# Patient Record
Sex: FEMALE | Race: BLACK OR AFRICAN AMERICAN | NOT HISPANIC OR LATINO | Employment: FULL TIME | URBAN - METROPOLITAN AREA
[De-identification: names, ages, dates, MRNs, and addresses within clinical notes are randomized per-mention and may not be internally consistent; named-entity substitution may affect disease eponyms.]

---

## 2018-03-13 ENCOUNTER — OFFICE VISIT (OUTPATIENT)
Dept: FAMILY MEDICINE | Facility: CLINIC | Age: 48
End: 2018-03-13

## 2018-03-13 VITALS
RESPIRATION RATE: 19 BRPM | OXYGEN SATURATION: 100 % | HEIGHT: 67 IN | DIASTOLIC BLOOD PRESSURE: 76 MMHG | BODY MASS INDEX: 34.26 KG/M2 | TEMPERATURE: 97.9 F | SYSTOLIC BLOOD PRESSURE: 126 MMHG | HEART RATE: 91 BPM | WEIGHT: 218.25 LBS

## 2018-03-13 DIAGNOSIS — E66.9 DIABETES MELLITUS TYPE 2 IN OBESE: ICD-10-CM

## 2018-03-13 DIAGNOSIS — N89.8 VAGINAL DISCHARGE: ICD-10-CM

## 2018-03-13 DIAGNOSIS — E11.69 DIABETES MELLITUS TYPE 2 IN OBESE: ICD-10-CM

## 2018-03-13 DIAGNOSIS — R10.32 LLQ ABDOMINAL PAIN: Primary | ICD-10-CM

## 2018-03-13 DIAGNOSIS — B96.89 BACTERIAL VAGINOSIS: ICD-10-CM

## 2018-03-13 DIAGNOSIS — B37.31 CANDIDIASIS OF VAGINA: ICD-10-CM

## 2018-03-13 DIAGNOSIS — N76.0 BACTERIAL VAGINOSIS: ICD-10-CM

## 2018-03-13 LAB
BACTERIA: NORMAL
BILIRUBIN UR: NEGATIVE
BLOOD UR: ABNORMAL
BUN SERPL-MCNC: 12 MG/DL (ref 5–24)
CALCIUM SERPL-MCNC: 9.1 MG/DL (ref 8.5–10.4)
CHLORIDE SERPLBLD-SCNC: 100 MMOL/L (ref 94–109)
CLUE CELLS: NORMAL
CO2 SERPL-SCNC: 28 MMOL/L (ref 20–32)
CREAT SERPL-MCNC: 0.8 MG/DL (ref 0.6–1.3)
EGFR CALCULATED (BLACK REFERENCE): >90 ML/MIN
EGFR CALCULATED (NON BLACK REFERENCE): 82.4 ML/MIN
GLUCOSE SERPL-MCNC: 93 MG/DL (ref 60–109)
GLUCOSE URINE: NEGATIVE
HBA1C MFR BLD: 6.2 % (ref 4.1–5.7)
KETONES UR QL: ABNORMAL
LEUKOCYTE ESTERASE UR: NEGATIVE
MOTILE TRICHOMONAS: NEGATIVE
NITRITE UR QL STRIP: NEGATIVE
ODOR: NORMAL
PH UR STRIP: 5.5 [PH] (ref 5–7)
PH WET PREP: NORMAL
POTASSIUM SERPL-SCNC: 4 MMOL/L (ref 3.4–5.3)
PROTEIN UR: NEGATIVE
SODIUM SERPL-SCNC: 135 MMOL/L (ref 133–144)
SP GR UR STRIP: 1.02
UROBILINOGEN UR STRIP-ACNC: ABNORMAL
WBC WET PREP: NORMAL
YEAST: PRESENT

## 2018-03-13 RX ORDER — FLUCONAZOLE 150 MG/1
150 TABLET ORAL ONCE
Qty: 1 TABLET | Refills: 0 | Status: SHIPPED | OUTPATIENT
Start: 2018-03-13 | End: 2018-03-13

## 2018-03-13 RX ORDER — METRONIDAZOLE 500 MG/1
500 TABLET ORAL 2 TIMES DAILY
Qty: 21 TABLET | Refills: 0 | Status: SHIPPED | OUTPATIENT
Start: 2018-03-13

## 2018-03-13 RX ORDER — CYCLOBENZAPRINE HCL 10 MG
5-10 TABLET ORAL 3 TIMES DAILY PRN
Qty: 15 TABLET | Refills: 0 | Status: SHIPPED | OUTPATIENT
Start: 2018-03-13

## 2018-03-13 NOTE — LETTER
March 15, 2018      Shahla Dey  [address] [facility] [address]        Dear Shahla,    Your results from your recent clinic visit show that you have a minor infection in your vagina that we already discussed. Make sure you  those antiinfectives I sent to the pharmacy and take the full course. Your other results were normal including your screen for diabetes.     Please see below for your test results.    Resulted Orders   Urinalysis ([facility])   Result Value Ref Range    Specific Gravity Urine 1.025 1.005 - 1.030    pH Urine 5.5 4.5 - 8.0    Leukocyte Esterase UR Negative NEGATIVE    Nitrite Urine Negative NEGATIVE    Protein UR Negative NEGATIVE    Glucose Urine Negative NEGATIVE    Ketones Urine Trace (A) NEGATIVE    Urobilinogen mg/dL 0.2 E.U./dL 0.2 E.U./dL    Bilirubin UR Negative NEGATIVE    Blood UR 2+ (A) NEGATIVE   Basic Metabolic Panel ([facility])  - Results < 1 hr   Result Value Ref Range    Glucose 93.0 60.0 - 109.0 mg/dL    Urea Nitrogen 12.0 5.0 - 24.0 mg/dL    Creatinine 0.8 0.6 - 1.3 mg/dL    Sodium 135.0 133.0 - 144.0 mmol/L    Potassium 4.0 3.4 - 5.3 mmol/L    Chloride 100.0 94.0 - 109.0 mmol/L    Carbon Dioxide 28.0 20.0 - 32.0 mmol/L    Calcium 9.1 8.5 - 10.4 mg/dL    eGFR Calculated (Non Black Reference) 82.4 >60.0 mL/min    eGFR Calculated (Black Reference) >90 >60.0 mL/min   Hemoglobin A1c ([facility])   Result Value Ref Range    Hemoglobin A1C 6.2 (H) 4.1 - 5.7 %   Wet Prep ([facility])   Result Value Ref Range    Yeast Wet Prep Present none    Motile Trichomonas Wet Prep Negative Negative    Clue Cells Wet Prep Present >20% NONE    WBC WET PREP 2-5 2 - 5    Bacteria Wet Prep Moderate None    pH Wet Prep Not performed 3.8 - 4.5    Odor Wet Prep None NONE       If you have any questions, please call the clinic to make an appointment.    Sincerely,    Inocencio Godinez MD

## 2018-03-13 NOTE — PROGRESS NOTES
"       HPI:       Shahla Dey is a 47 year old  female w/ PMH of DM2 who presents for the new concern(s) of    Hx somewhat limited by language barrier but patient and  refused     Abdominal pain:  - occurring since Thursday  - intermittent with 10/10 pain at its worst  - notices it with position changes  - has tried aleve with ice and helps some but minimal  - describes the pain as a burning and sharp pain  - No radiation of pain down legs  - has had kidney stones before and fee ls similar         PMHX:     There is no problem list on file for this patient.      No current outpatient prescriptions on file.       Social History     Social History     Marital status:      Spouse name: N/A     Number of children: N/A     Years of education: N/A     Occupational History     Not on file.     Social History Main Topics     Smoking status: Never Smoker     Smokeless tobacco: Never Used     Alcohol use Not on file     Drug use: Not on file     Sexual activity: Not on file     Other Topics Concern     Not on file     Social History Narrative     No narrative on file        No Known Allergies    No results found for this or any previous visit (from the past 24 hour(s)).         Review of Systems:   Complete review of systems is negative except as noted in the HPI          Physical Exam:     Vitals:    03/13/18 1020   BP: 126/76   Pulse: 91   Resp: 19   Temp: 97.9  F (36.6  C)   TempSrc: Oral   SpO2: 100%   Weight: 218 lb 4 oz (99 kg)   Height: 5' 7\" (170.2 cm)     Body mass index is 34.18 kg/(m^2).    GENERAL APPEARANCE: healthy, alert and no distress,  RESP: lungs clear to auscultation - no rales, rhonchi or wheezes  CV: regular rate and rhythm,  and no murmur, click,  rub or gallop  ABDOMEN: soft, mild left flank tenderness, without hepatosplenomegaly or masses  : See Dr. Sandoval's Budd exam as patient refused myself. From report foul smelling white vaginal discharge. No cervical motion " tenderness    Assessment and Plan     1. LLQ abdominal pain: Described as left flank, low back and abdominal pain. Abdominal exam shows very little tenderness. Worse with movement and no tenderness on palpation w/ normal UA and BMP makes MSK the most likely etiology.   - Urinalysis ([facility])  - Basic Metabolic Panel ([facility])  - Results < 1 hr  - cyclobenzaprine (FLEXERIL) 10 MG tablet; Take 0.5-1 tablets (5-10 mg) by mouth 3 times daily as needed for muscle spasms  Dispense: 15 tablet; Refill: 0  - Follow up in 2 weeks if pain does not improve    2. Diabetes mellitus type 2 in obese (H): No recent A1C. Was reportedly on metformin but not taking any longer.   - Hemoglobin A1c ([facility])    3. Vaginal discharge:   exam does reveal significant discharge with some semi solid milky components and foul odor. Wet prep positive for clue cells and yeast present. Prescribed anti-infectives per below.  - Wet Prep ([facility] )    4. Bacterial vaginosis  - metroNIDAZOLE (FLAGYL) 500 MG tablet; Take 1 tablet (500 mg) by mouth 2 times daily  Dispense: 21 tablet; Refill: 0    5. Candidiasis of vagina  - fluconazole (DIFLUCAN) 150 MG tablet; Take 1 tablet (150 mg) by mouth once for 1 dose  Dispense: 1 tablet; Refill: 0    Options for treatment and follow-up care were reviewed with the patient and/or guardian. Shahla AMELIA Dey and/or guardian engaged in the decision making process and verbalized understanding of the options discussed and agreed with the final plan.    Inocencio Godinez MD      Precepted today with: Samantha Minor MD

## 2018-03-13 NOTE — MR AVS SNAPSHOT
"              After Visit Summary   3/13/2018    Shahla Dey    MRN: [id number]           Patient Information     Date Of Birth          1970        Visit Information        Provider Department      3/13/2018 10:20 AM Inocencio Godinez MD [facility]        Today's Diagnoses     LLQ abdominal pain    -  1    Diabetes mellitus type 2 in obese (H)        Vaginal discharge        Bacterial vaginosis        Candidiasis of vagina           Follow-ups after your visit        Follow-up notes from your care team     Return if symptoms worsen or fail to improve.      Who to contact     Please call your clinic at [phone] to:    Ask questions about your health    Make or cancel appointments    Discuss your medicines    Learn about your test results    Speak to your doctor            Additional Information About Your Visit        MyChart Information     [employer] is an electronic gateway that provides easy, online access to your medical records. With [employer], you can request a clinic appointment, read your test results, renew a prescription or communicate with your care team.     To sign up for [employer] visit the website at [email or web address]   You will be asked to enter the access code listed below, as well as some personal information. Please follow the directions to create your username and password.     Your access code is: [email or web address]  Expires: 2018 10:26 AM     Your access code will  in 90 days. If you need help or a new code, please contact your [facility] or call [phone] for assistance.        Care EveryWhere ID     This is your Care EveryWhere ID. This could be used by other organizations to access your [place] medical records  [id number]        Your Vitals Were     Pulse Temperature Respirations Height Last Period Pulse Oximetry    91 97.9  F (36.6  C) (Oral) 19 5' 7\" (170.2 cm) 2018 100%    BMI (Body Mass Index)                   " 34.18 kg/m2            Blood Pressure from Last 3 Encounters:   03/13/18 126/76    Weight from Last 3 Encounters:   03/13/18 218 lb 4 oz (99 kg)              We Performed the Following     Basic Metabolic Panel ([facility])  - Results < 1 hr     Hemoglobin A1c ([facility])     Urinalysis ([facility])     Wet Prep ([facility])          Today's Medication Changes          These changes are accurate as of 3/13/18 11:59 PM.  If you have any questions, ask your nurse or doctor.               Start taking these medicines.        Dose/Directions    cyclobenzaprine 10 MG tablet   Commonly known as:  FLEXERIL   Used for:  LLQ abdominal pain   Started by:  Inocencio Godinez MD        Dose:  5-10 mg   Take 0.5-1 tablets (5-10 mg) by mouth 3 times daily as needed for muscle spasms   Quantity:  15 tablet   Refills:  0       fluconazole 150 MG tablet   Commonly known as:  DIFLUCAN   Used for:  Candidiasis of vagina   Started by:  Inocencio Godinez MD        Dose:  150 mg   Take 1 tablet (150 mg) by mouth once for 1 dose   Quantity:  1 tablet   Refills:  0       metroNIDAZOLE 500 MG tablet   Commonly known as:  FLAGYL   Used for:  Bacterial vaginosis   Started by:  Inocencio Godinez MD        Dose:  500 mg   Take 1 tablet (500 mg) by mouth 2 times daily   Quantity:  21 tablet   Refills:  0            Where to get your medicines      These medications were sent to [facility] [address] [place] [id number]     Phone:  [phone]     cyclobenzaprine 10 MG tablet    fluconazole 150 MG tablet    metroNIDAZOLE 500 MG tablet                Primary Care Provider Office Phone #    Inocencio Godinez -[phone]       [facility] [address] [place] [address]        Equal Access to Services     [facility] SHAYLA AH: Anitra Vasques, mirta durbin, carlee kaalmona ziegler, bill manzanares. [facility] [phone].    ATENCIÓN: Tanja crespo  español, tiene a samaniego disposición servicios gratuitos de asistencia lingüística. Phani yi [phone].    We comply with applicable federal civil rights laws and [place] laws. We do not discriminate on the basis of race, color, national origin, age, disability, sex, sexual orientation, or gender identity.            Thank you!     Thank you for choosing [facility]  for your care. Our goal is always to provide you with excellent care. Hearing back from our patients is one way we can continue to improve our services. Please take a few minutes to complete the written survey that you may receive in the mail after your visit with us. Thank you!             Your Updated Medication List - Protect others around you: Learn how to safely use, store and throw away your medicines at www.disposemymeds.org.          This list is accurate as of 3/13/18 11:59 PM.  Always use your most recent med list.                   Brand Name Dispense Instructions for use Diagnosis    cyclobenzaprine 10 MG tablet    FLEXERIL    15 tablet    Take 0.5-1 tablets (5-10 mg) by mouth 3 times daily as needed for muscle spasms    LLQ abdominal pain       fluconazole 150 MG tablet    DIFLUCAN    1 tablet    Take 1 tablet (150 mg) by mouth once for 1 dose    Candidiasis of vagina       metroNIDAZOLE 500 MG tablet    FLAGYL    21 tablet    Take 1 tablet (500 mg) by mouth 2 times daily    Bacterial vaginosis

## 2018-03-13 NOTE — LETTER
[employer]  [facility]  [address], [address]  [place] [address]  Fax: [phone]  Phone: [phone]      2018      Shahla Dey  [address] [place] [id number]        Dear Shahla,    Thank you for your interest in becoming a [employer] user!    Your access code is: [email or web address]  Expires: 2018 10:26 AM     Please access the [employer] website at [email or web address].  Below the ID and password fields, select the  Sign Up Now  as New User.  You will be prompted to enter the access code listed above as well as additional personal information.  Please follow the directions carefully when creating your username and password.    If you allow your access code to , or if you have any questions please call a [employer] Representative at [phone] during normal clinic hours.     Sincerely,      [employer]  [facility]

## 2018-03-18 ENCOUNTER — HEALTH MAINTENANCE LETTER (OUTPATIENT)
Age: 48
End: 2018-03-18

## 2018-03-22 NOTE — PROGRESS NOTES
Preceptor Attestation:  Patient's case reviewed and discussed with Inocencio Godinez MD resident and I evaluated the patient. I agree with written assessment and plan of care.  Supervising Physician:  ALBERTA PENDLETON MD  [facility]

## 2024-01-01 ENCOUNTER — HOSPITAL ENCOUNTER (EMERGENCY)
Facility: CLINIC | Age: 54
Discharge: HOME | End: 2024-01-01
Admitting: EMERGENCY MEDICINE
Payer: OTHER MISCELLANEOUS

## 2024-01-01 VITALS
RESPIRATION RATE: 20 BRPM | TEMPERATURE: 97.7 F | HEART RATE: 69 BPM | SYSTOLIC BLOOD PRESSURE: 174 MMHG | OXYGEN SATURATION: 99 % | DIASTOLIC BLOOD PRESSURE: 83 MMHG

## 2024-01-01 DIAGNOSIS — S01.81XA FACIAL LACERATION, INITIAL ENCOUNTER: ICD-10-CM

## 2024-01-01 PROCEDURE — 12011 RPR F/E/E/N/L/M 2.5 CM/<: CPT

## 2024-01-01 PROCEDURE — 99283 EMERGENCY DEPT VISIT LOW MDM: CPT

## 2024-01-01 PROCEDURE — 250N000013 HC RX MED GY IP 250 OP 250 PS 637: Performed by: EMERGENCY MEDICINE

## 2024-01-01 RX ORDER — ACETAMINOPHEN 325 MG/1
975 TABLET ORAL ONCE
Status: COMPLETED | OUTPATIENT
Start: 2024-01-01 | End: 2024-01-01

## 2024-01-01 RX ADMIN — ACETAMINOPHEN 975 MG: 325 TABLET ORAL at 11:26

## 2024-01-01 ASSESSMENT — ENCOUNTER SYMPTOMS
NUMBNESS: 0
NECK PAIN: 0
WOUND: 1
NAUSEA: 0
BACK PAIN: 0
HEADACHES: 1
VOMITING: 0
WEAKNESS: 0

## 2024-01-01 NOTE — Clinical Note
Shahla Dey was seen and treated in our emergency department on 1/1/2024.  She may return to work on 01/03/2024.       If you have any questions or concerns, please don't hesitate to call.      Arina Cortez PA-C

## 2024-01-01 NOTE — ED PROVIDER NOTES
EMERGENCY DEPARTMENT ENCOUNTER      NAME: Shahla Dey  AGE: 53 year old female  YOB: 1970  MRN: [id number]  EVALUATION DATE & TIME: 1/1/2024 11:07 AM    PCP: Inocencio New    ED PROVIDER: Arina Cortez PA-C      Chief Complaint   Patient presents with    Head Laceration         FINAL IMPRESSION:  1. Facial laceration, initial encounter          ED COURSE & MEDICAL DECISION MAKING:    Pertinent Labs & Imaging studies reviewed. (See chart for details)    53 year old female presents to the Emergency Department for evaluation of facial laceration.     Physical exam is remarkable for a generally well-appearing female who is in no acute distress.  She has a 1 mm superficial laceration on the left forehead which is tender to palpation.  No focal neurologic deficits noted, cranial nerves III through XII appear grossly intact.  No raccoon eyes or Garza sign.  Patient moves all extremities without difficulty.  No spinal tenderness or step-offs.  Vital signs remarkable for hypertension but improved without intervention, remainder are stable and she is afebrile.    I do not think any emergent labs or imaging are indicated at this time.  The patient is overall well-appearing here and hemodynamically stable.  She has no physical exam findings concerning for intracranial bleeding like raccoon eyes or Garza sign, no deficits noted on exam.  She did not have a loss of consciousness.  She has no spinal tenderness or step-offs and moves all extremities without difficulty.  She is cleared from imaging of the head and neck by [place] CT criteria.  The laceration was closed using skin glue, the patient tolerated the procedure well.  Tdap is up to date.  Advised Tylenol at home for pain, she was given a dose here.  Recommend follow-up with her primary care provider as needed for recheck and to have her blood pressure rechecked, recommend return here for any new or worsening symptoms.  The patient is agreeable  with this treatment plan and verbalized understanding.    Medical Decision Making    History:  Supplemental history from: Family Member/Significant Other  External Record(s) reviewed: Documented in chart, if applicable.    Work Up:  Chart documentation includes differential considered and any EKGs or imaging independently interpreted by provider, where specified.  In additional to work up documented, I considered the following work up: Imaging CT, but deferred due to cleared by [place] CT criteria.    External consultation:  Discussion of management with another provider: Documented in chart, if applicable    Complicating factors:  Care impacted by chronic illness: N/A  Care affected by social determinants of health: N/A    Disposition considerations: Discharge. No recommendations on prescription strength medication(s). N/A.    ED Course   10:50 AM Performed my initial history and physical exam. Discussed workup in the emergency department, management of symptoms, and likely disposition.   10:55 AM Repaired laceration. I discussed the plan for discharge with the patient or family and they are agreeable.. We discussed supportive cares at home and reasons for return to the ER including new or worsening symptoms - all questions and concerns addressed. Patient to be discharged by RN.    At the conclusion of the encounter I discussed the results of all of the tests and the disposition. The questions were answered. The patient or family acknowledged understanding and was agreeable with the care plan.     Voice recognition software was used in the creation of this note. Any grammatical or nonsensical errors are due to inherent errors with the software and are not the intention of the writer.     MEDICATIONS GIVEN IN THE EMERGENCY:  Medications   acetaminophen (TYLENOL) tablet 975 mg (975 mg Oral $Given 1/1/24 1126)       NEW PRESCRIPTIONS STARTED AT TODAY'S ER VISIT  New Prescriptions    No medications on file             =================================================================    HPI    Patient information was obtained from: Patient, patient's daughter    Use of : Daughter- declined professional         Shahla Dey is a 53 year old female who presents to the ED via walk-in with daughter for evaluation of a head injury.    The patient states that she was leaning down to tie her shoe at work when she stood up and hit her left forehead on a metal shelf.  She did not fall to the ground or lose consciousness.  She sustained a laceration to her left forehead which prompts her presentation. She has a headache associated with the laceration.    She denies any visual changes, nausea, vomiting, confusion, back pain, neck pain, or new numbness/tingling/weakness in the extremities.  She is not anticoagulated.      REVIEW OF SYSTEMS   Review of Systems   Eyes:  Negative for visual disturbance.   Gastrointestinal:  Negative for nausea and vomiting.   Musculoskeletal:  Negative for back pain and neck pain.   Skin:  Positive for wound.   Neurological:  Positive for headaches. Negative for syncope, weakness and numbness.       All other systems reviewed and are negative unless noted in HPI.      PAST MEDICAL HISTORY:  No past medical history on file.    PAST SURGICAL HISTORY:  Past Surgical History:   Procedure Laterality Date    APPENDECTOMY         CURRENT MEDICATIONS:    cyclobenzaprine (FLEXERIL) 10 MG tablet  metroNIDAZOLE (FLAGYL) 500 MG tablet        ALLERGIES:  Allergies   Allergen Reactions    Quinine Sulfate [Quinine] Unknown       FAMILY HISTORY:  Family History   Problem Relation Age of Onset    Diabetes No family hx of     Coronary Artery Disease No family hx of     Hypertension No family hx of     Hyperlipidemia No family hx of     Cerebrovascular Disease No family hx of     Breast Cancer No family hx of        SOCIAL HISTORY:   Social History     Socioeconomic History    Marital status:     Tobacco Use    Smoking status: Never    Smokeless tobacco: Never       VITALS:  Patient Vitals for the past 24 hrs:   BP Temp Temp src Pulse Resp SpO2   01/01/24 1133 (!) 174/83 -- -- 69 -- 99 %   01/01/24 1129 (!) 174/86 -- -- 72 -- 99 %   01/01/24 1032 (!) 204/98 97.7  F (36.5  C) Temporal 74 20 99 %       PHYSICAL EXAM    VITAL SIGNS: BP (!) 174/83   Pulse 69   Temp 97.7  F (36.5  C) (Temporal)   Resp 20   LMP 12/18/2023   SpO2 99%   General Appearance: Alert, cooperative, normal speech and facial symmetry, appears stated age, the patient does not appear in distress  Head: 1 mm superficial laceration on the left forehead which is tender to palpation; no raccoon eyes or battles sign  Eyes: Conjunctiva/corneas clear, EOM's intact, no nystagmus, PERRL  ENT:  Lips, mucosa, and tongue normal; teeth and gums normal, no pharyngeal inflammation, no dysphonia or difficulty swallowing, membranes are moist without pallor  Back: No midline tenderness or step-offs  Extremities:  Moves all extremities  Neuro: Patient is awake, alert, and responsive to voice. No gross motor weaknesses or sensory loss; moves all extremities. Cranial Nerves:  CN2: No funduscopic exam performed. CN3,4 & 6: Pupillary light response, lateral and vertical gaze normal.  No nystagmus.  CN7: No facial weakness, smile, facial symmetry intact. CN8: Intact to spoken voice. CN9&10: Gag reflex, uvula midline, palate rises with phonation. CN11: Shoulder shrug 5/5 intact bilaterally. CN12: Tongue midline and moves freely from side to side.      LAB:  All pertinent labs reviewed and interpreted.  Labs Ordered and Resulted from Time of ED Arrival to Time of ED Departure - No data to display    RADIOLOGY:  Reviewed all pertinent imaging. Please see official radiology report.  No orders to display       PROCEDURES:   [facility]    -Laceration Repair    Date/Time: 1/1/2024 11:16 AM    Performed by: Arina Cortez  HARRISON  Authorized by: Arina Cortez PA-C    Risks, benefits and alternatives discussed.      ANESTHESIA (see MAR for exact dosages):     Anesthesia method:  None  LACERATION DETAILS     Location:  Face    Face location:  Forehead    Length (cm):  0.1    REPAIR TYPE:     Repair type:  Simple    EXPLORATION:     Wound extent: areolar tissue not violated, fascia not violated, no foreign body, no signs of injury, no nerve damage, no tendon damage and no vascular damage      TREATMENT:     Area cleansed with:  Saline    Amount of cleaning:  Standard    Irrigation solution:  Sterile saline    SKIN REPAIR     Repair method:  Tissue adhesive    APPROXIMATION     Approximation:  Close    POST-PROCEDURE DETAILS     Dressing:  Open (no dressing)      PROCEDURE    Patient Tolerance:  Patient tolerated the procedure well with no immediate complications        Arina Cortez PA-C  Emergency Medicine  [facility] EMERGENCY ROOM  [address] [place] [id number]  [phone]  Dept: [phone]       Arina Cortez PA-C  01/01/24 1140

## 2024-01-01 NOTE — DISCHARGE INSTRUCTIONS
You were seen here today for evaluation of a laceration. This was closed using skin glue. Do not get the glue wet for 24 hours, after that she may bathe normally but do not submerge the glue in water. The glue will fall off on its own in 5-7 days, try not to pick it off.     Your blood pressure was high today, this could be due to the situation/pain but please have this rechecked at your primary care provider.    You may take Tylenol and ibuprofen for pain/fever, do not exceed 4000 mg of Tylenol per day or 3200 mg ofibuprofen per day.    Follow up with her primary care provider if needed for recheck. Return here for any new or worsening symptoms including severe pain, confusion, vomiting, lethargy, difficulty walking or talking, or signs of infection of the wound like worsening redness, swelling, pus discharge, or fever.

## 2024-01-01 NOTE — ED TRIAGE NOTES
Pt was at work and stood up and hit left side of head on metal shelf and now has laceration, c.o headache. Pt daughter interpreting for pt in triage but pt does understand and speak english daughter states just easier, declined .  Denies LOC. Denies blood thinners.  Last tetanus per chart 2022. Pt has small superficial laceration to left forehead area. No bleeding at this time.

## 2024-02-27 ENCOUNTER — HOSPITAL ENCOUNTER (EMERGENCY)
Facility: CLINIC | Age: 54
Discharge: HOME | End: 2024-02-27
Attending: EMERGENCY MEDICINE | Admitting: EMERGENCY MEDICINE
Payer: COMMERCIAL

## 2024-02-27 VITALS
RESPIRATION RATE: 20 BRPM | BODY MASS INDEX: 35.36 KG/M2 | DIASTOLIC BLOOD PRESSURE: 78 MMHG | SYSTOLIC BLOOD PRESSURE: 131 MMHG | HEART RATE: 73 BPM | HEIGHT: 66 IN | OXYGEN SATURATION: 97 % | WEIGHT: 220 LBS | TEMPERATURE: 99.1 F

## 2024-02-27 DIAGNOSIS — R53.83 TIREDNESS: ICD-10-CM

## 2024-02-27 PROCEDURE — 99283 EMERGENCY DEPT VISIT LOW MDM: CPT

## 2024-02-27 ASSESSMENT — COLUMBIA-SUICIDE SEVERITY RATING SCALE - C-SSRS
2. HAVE YOU ACTUALLY HAD ANY THOUGHTS OF KILLING YOURSELF IN THE PAST MONTH?: NO
1. IN THE PAST MONTH, HAVE YOU WISHED YOU WERE DEAD OR WISHED YOU COULD GO TO SLEEP AND NOT WAKE UP?: NO
6. HAVE YOU EVER DONE ANYTHING, STARTED TO DO ANYTHING, OR PREPARED TO DO ANYTHING TO END YOUR LIFE?: NO

## 2024-02-27 NOTE — ED NOTES
Bed: [facility]-  Expected date: 2/27/24  Expected time: 12:53 AM  Means of arrival: Ambulance  Comments:  [facility]  52 y/o F  cramp  Bp:161/palp  100% SPO2

## 2024-02-27 NOTE — ED PROVIDER NOTES
EMERGENCY DEPARTMENT ENCOUNTER      NAME: Shahla Dey  AGE: 53 year old female  YOB: 1970  MRN: [id number]  EVALUATION DATE & TIME: 2/27/2024  1:05 AM    PCP: Yordan Medellin    ED PROVIDER: Fabiana Salgado M.D.      Chief Complaint   Patient presents with    Leg Pain     cramping         FINAL IMPRESSION:  1. Tiredness          ED COURSE & MEDICAL DECISION MAKING:    ED Course as of 02/27/24 0126   Tue Feb 27, 2024   0125 Patient BIB EMS because she felt tired at 12:30am when she woke up in the middle of the night and so she called her daughter and then EMS called. Normal examination and VS. Patient discharged after being provided with extensive anticipatory guidance and given return precautions, importance of PMD follow-up emphasized.        Pertinent Labs & Imaging studies reviewed. (See chart for details)    N95 worn  A face shield was worn also  COVID PPE    Medical Decision Making  Obtained supplemental history:Supplemental history obtained?: EMS  Reviewed external records: External records reviewed?: Outpatient Record: Primary Care Visit 1/2/24  Care impacted by chronic illness:Diabetes, Hyperlipidemia, and Hypertension  Care significantly affected by social determinants of health:N/A  Did you consider but not order tests?: Work up considered but not performed and documented in chart, if applicable  Did you interpret images independently?: Independent interpretation of ECG and images noted in documentation, when applicable.  Consultation discussion with other provider:Did you involve another provider (consultant, MH, pharmacy, etc.)?: No  Discharge. No recommendations on prescription strength medication(s). See documentation for any additional details.    At the conclusion of the encounter I discussed the results of all of the tests and the disposition. The questions were answered. The patient or family acknowledged understanding and was agreeable with the care plan.     MEDICATIONS GIVEN IN THE  EMERGENCY:  Medications - No data to display    NEW PRESCRIPTIONS STARTED AT TODAY'S ER VISIT  New Prescriptions    No medications on file          =================================================================    HPI      Shahla Dey is a 53 year old female with PMHx of HTN, HLD, diabetes mellitus type II, chronic anemia, and electrolyte abnormalities who presents to the ED today via EMS with leg cramps.    The patient reports she woke up around 0030 this morning feeling tired. She got up to use the bathroom and while up and walking felt cramps in her legs. She called to her daughter for help. When her daughter and  came they insisted on calling an ambulance to have the patient evaluated. The patient initially did not want to come to the ED for evaluation, but her family eventually persuaded her to come in. Here in the ED she is feeling tired, but is not having leg cramps anymore. She denies any chest pain, shortness of breath, chest pain, shortness of breath or any other complaints at this time.      REVIEW OF SYSTEMS   All other systems reviewed and are negative except as noted above in HPI.    PAST MEDICAL HISTORY:  No past medical history on file.    PAST SURGICAL HISTORY:  Past Surgical History:   Procedure Laterality Date    APPENDECTOMY         CURRENT MEDICATIONS:    cyclobenzaprine (FLEXERIL) 10 MG tablet  metroNIDAZOLE (FLAGYL) 500 MG tablet        ALLERGIES:  Allergies   Allergen Reactions    Quinine Unknown and Rash     ITCHY       FAMILY HISTORY:  Family History   Problem Relation Age of Onset    Diabetes No family hx of     Coronary Artery Disease No family hx of     Hypertension No family hx of     Hyperlipidemia No family hx of     Cerebrovascular Disease No family hx of     Breast Cancer No family hx of        SOCIAL HISTORY:   Social History     Socioeconomic History    Marital status:    Tobacco Use    Smoking status: Never    Smokeless tobacco: Never       VITALS:  Patient  "Vitals for the past 24 hrs:   BP Temp Temp src Pulse Resp SpO2 Height Weight   02/27/24 0113 131/78 99.1  F (37.3  C) Temporal 73 20 97 % 1.676 m (5' 6\") 99.8 kg (220 lb)       PHYSICAL EXAM    GENERAL: Awake, alert.  In no acute distress.   HEENT: Normocephalic, atraumatic.  Pupils equal, round and reactive.  Conjunctiva normal.  EOMI.  NECK: No stridor or apparent deformity.  PULMONARY: Symmetrical breath sounds without distress.  Lungs clear to auscultation bilaterally without wheezes, rhonchi or rales.  CARDIO: Regular rate and rhythm.  No significant murmur, rub or gallop.  Radial pulses strong and symmetrical.  ABDOMINAL: Abdomen soft, non-distended and non-tender to palpation.  No CVAT, no palpable hepatosplenomegaly.  EXTREMITIES: No lower extremity swelling or edema.    NEURO: Alert and oriented to person, place and time.  Cranial nerves grossly intact.  No focal motor deficit.  PSYCH: Normal mood and affect  SKIN: No rashes       IFilipe, am serving as a scribe to document services personally performed by Dr. Fabiana Salgado based on my observation and the provider's statements to me. Fabiana DAVIDSON MD attest that Filipe Varela is acting in a scribe capacity, has observed my performance of the services and has documented them in accordance with my direction.       Fabiana Salgado MD  02/27/24 0126    "

## 2024-02-27 NOTE — ED TRIAGE NOTES
Brought in by ems. Pt states that she woke up to use the bathroom and states that she felt more tired than normal and states that her legs felt crampy' pt called her daughter, who called 911 for patient. Upon arrival to ED, patient no longer experiencing leg cramps      Triage Assessment (Adult)       Row Name 02/27/24 0114          Respiratory WDL    Respiratory WDL WDL        Skin Circulation/Temperature WDL    Skin Circulation/Temperature WDL WDL        Cardiac WDL    Cardiac WDL WDL        Peripheral/Neurovascular WDL    Peripheral Neurovascular WDL WDL        Cognitive/Neuro/Behavioral WDL    Cognitive/Neuro/Behavioral WDL WDL